# Patient Record
Sex: FEMALE | Race: AMERICAN INDIAN OR ALASKA NATIVE
[De-identification: names, ages, dates, MRNs, and addresses within clinical notes are randomized per-mention and may not be internally consistent; named-entity substitution may affect disease eponyms.]

---

## 2018-09-14 ENCOUNTER — HOSPITAL ENCOUNTER (EMERGENCY)
Dept: HOSPITAL 56 - MW.ED | Age: 48
Discharge: HOME | End: 2018-09-14
Payer: MEDICARE

## 2018-09-14 DIAGNOSIS — K94.09: Primary | ICD-10-CM

## 2018-09-14 DIAGNOSIS — F17.210: ICD-10-CM

## 2018-09-14 LAB
CHLORIDE SERPL-SCNC: 95 MMOL/L (ref 98–107)
SODIUM SERPL-SCNC: 130 MMOL/L (ref 136–145)

## 2018-09-14 PROCEDURE — 85025 COMPLETE CBC W/AUTO DIFF WBC: CPT

## 2018-09-14 PROCEDURE — 99284 EMERGENCY DEPT VISIT MOD MDM: CPT

## 2018-09-14 PROCEDURE — 80053 COMPREHEN METABOLIC PANEL: CPT

## 2018-09-14 PROCEDURE — 96361 HYDRATE IV INFUSION ADD-ON: CPT

## 2018-09-14 PROCEDURE — 83605 ASSAY OF LACTIC ACID: CPT

## 2018-09-14 PROCEDURE — 96365 THER/PROPH/DIAG IV INF INIT: CPT

## 2018-09-14 NOTE — EDM.PDOC
ED HPI GENERAL MEDICAL PROBLEM





- General


Chief Complaint: Abdominal Pain


Stated Complaint: ABDOMINAL PAIN, HAS OSTOMY BAG


Time Seen by Provider: 09/14/18 07:31


Source of Information: Reports: Patient


History Limitations: Reports: No Limitations





- History of Present Illness


INITIAL COMMENTS - FREE TEXT/NARRATIVE: 





History of present illness:


[]Patient had an ovarian mass removed in May this year in [facility] and 

patient ended up with a colostomy. It was functioning until a month ago when 

stool started leaking from her midline incision and not from her ostomy. She 

was seen back in [facility] to make follow-up appointment however she did 

not follow-up. She has been losing weight and is unable to keep the drainage 

dry being that the ostomy bags would not cover her scar tissue. She denies any 

fevers, chills or vomiting.





Review of systems: 


As per history of present illness and below otherwise all systems reviewed and 

negative.





Past medical history: 


As per history of present illness and as reviewed below otherwise 

noncontributory.





Surgical history: 


As per history of present illness and as reviewed below otherwise 

noncontributory.





Social history: 


No reported history of drug or alcohol abuse.





Family history: 


As per history of present illness and as reviewed below otherwise 

noncontributory.





Physical exam:


General: Well developed, cachectic in NAD


HEENT: Atraumatic, normocephalic, pupils reactive, negative for conjunctival 

pallor or scleral icterus, mucous membranes dry, throat clear, neck supple, 

nontender, trachea midline.


Lungs: Clear to auscultation, breath sounds equal bilaterally, chest nontender.


Heart: S1S2, regular, negative for clicks, rubs, or JVD.


Abdomen: Soft, nondistended, left lower quadrant ostomy  with no air in the 

bag. Lower Midline incision scarred with opening draining stool. Negative for 

masses or hepatosplenomegaly. Negative for costovertebral tenderness.


Pelvis: Stable nontender.


Genitourinary: Deferred.


Rectal: Deferred.


Extremities: Atraumatic, negative for cords or calf pain. Neurovascular 

unremarkable.


Neuro: Awake, alert, oriented. Cranial nerves II through XII unremarkable. 

Cerebellum unremarkable. Motor and sensory unremarkable throughout. Exam 

nonfocal.


Skin:warm and dry





Diagnostics:


CBC, chemistry, lactate





Therapeutics:


IV fluids, Zosyn started





ED Course:


Patient remained stable while in the ED





Impression: 


Failed ostomy,  fistula between the incision and bowel





Prescriptions:








Plan:


Discussed with Dr. Diaz general surgery on-call and Dr. Gayle neurosurgery 

at [facility] who accepted her for transfer





Definitive disposition and diagnosis as appropriate pending reevaluation and 

review of above.





- Related Data


 Allergies











Allergy/AdvReac Type Severity Reaction Status Date / Time


 


No Known Allergies Allergy   Verified 09/14/18 07:41











Home Meds: 


 Home Meds





oxyCODONE ER [OxyCONTIN] 1 tab PO Q6H PRN 09/14/18 [History]











Past Medical History





- Past Surgical History


GI Surgical History: Reports: Other (See Below)


Other GI Surgeries/Procedures: perf colon and ostomy


Female  Surgical History: Reports: Hysterectomy, Other (See Below)


Other Female  Surgeries/Procedures: "mass in tube"





Social & Family History





- Family History


Family Medical History: Noncontributory





- Tobacco Use


Smoking Status *Q: Current Every Day Smoker


Years of Tobacco use: 10


Packs/Tins Daily: 0.5





- Recreational Drug Use


Recreational Drug Use: No





ED ROS GENERAL





- Review of Systems


Review Of Systems: ROS reveals no pertinent complaints other than HPI.





ED EXAM, GI/ABD





- Physical Exam


Exam: See Below (See history of present illness)





Course





- Vital Signs


Last Recorded V/S: 


 Last Vital Signs











Temp  97.1 F   09/14/18 07:44


 


Pulse  127 H  09/14/18 07:44


 


Resp  19   09/14/18 07:44


 


BP  105/82   09/14/18 07:44


 


Pulse Ox  96   09/14/18 07:44














- Orders/Labs/Meds


Orders: 


 Active Orders 24 hr











 Category Date Time Status


 


 Piperacillin/Tazobactam [Piperacil-Tazobact] 3.375 gm Med  09/14/18 09:24 

Active





 Sodium Chloride 0.9% [Normal Saline] 50 ml   





 IV ONETIME   


 


 Sodium Chloride 0.9% [Saline Flush] Med  09/14/18 07:56 Active





 10 ml FLUSH ASDIRECTED PRN   


 


 Sodium Chloride 0.9% [Saline Flush] Med  09/14/18 07:56 Active





 2.5 ml FLUSH ASDIRECTED PRN   


 


 Saline Lock Insert [OM.PC] Stat Oth  09/14/18 07:56 Ordered








 Medication Orders





Piperacillin Sod/Tazobactam (Sod 3.375 gm/ Sodium Chloride)  50 mls @ 100 mls/

hr IV ONETIME ONE


   Stop: 09/14/18 09:53


   Last Admin: 09/14/18 09:39  Dose: 100 mls/hr


Sodium Chloride (Saline Flush)  10 ml FLUSH ASDIRECTED PRN


   PRN Reason: Keep Vein Open


Sodium Chloride (Saline Flush)  2.5 ml FLUSH ASDIRECTED PRN


   PRN Reason: Keep Vein Open








Labs: 


 Laboratory Tests











  09/14/18 09/14/18 09/14/18 Range/Units





  08:05 08:05 08:54 


 


WBC  16.20 H    (4.0-11.0)  K/uL


 


RBC  4.79    (4.30-5.90)  M/uL


 


Hgb  12.0    (12.0-16.0)  g/dL


 


Hct  37.2    (36.0-46.0)  %


 


MCV  77.7 L    (80.0-98.0)  fL


 


MCH  25.1 L    (27.0-32.0)  pg


 


MCHC  32.3    (31.0-37.0)  g/dL


 


RDW Std Deviation  48.7    (28.0-62.0)  fl


 


RDW Coeff of Goldy  17 H    (11.0-15.0)  %


 


Plt Count  517 H    (150-400)  K/uL


 


MPV  8.10    (7.40-12.00)  fL


 


Neut % (Auto)  68.1    (48.0-80.0)  %


 


Lymph % (Auto)  25.6    (16.0-40.0)  %


 


Mono % (Auto)  6.1    (0.0-15.0)  %


 


Eos % (Auto)  0.0    (0.0-7.0)  %


 


Baso % (Auto)  0.2    (0.0-1.5)  %


 


Neut # (Auto)  11.0 H    (1.4-5.7)  K/uL


 


Lymph # (Auto)  4.1 H    (0.6-2.4)  K/uL


 


Mono # (Auto)  1.0 H    (0.0-0.8)  K/uL


 


Eos # (Auto)  0.0    (0.0-0.7)  K/uL


 


Baso # (Auto)  0.0    (0.0-0.1)  K/uL


 


Nucleated RBC %  0.0    /100WBC


 


Nucleated RBCs #  0    K/uL


 


Lactate    0.9  (0.20-2.00)  mmol/L


 


Sodium   130 L   (136-145)  mmol/L


 


Potassium   3.9   (3.5-5.1)  mmol/L


 


Chloride   95 L   ()  mmol/L


 


Carbon Dioxide   25.0   (21.0-32.0)  mmol/L


 


BUN   8   (7.0-18.0)  mg/dL


 


Creatinine   0.7   (0.6-1.0)  mg/dL


 


Est Cr Clr Drug Dosing   66.91   mL/min


 


Estimated GFR (MDRD)   > 60.0   ml/min


 


Glucose   125 H   ()  mg/dL


 


Calcium   8.6   (8.5-10.1)  mg/dL


 


Total Bilirubin   0.3   (0.2-1.0)  mg/dL


 


AST   10 L   (15-37)  IU/L


 


ALT   10 L   (14-63)  IU/L


 


Alkaline Phosphatase   189 H   ()  U/L


 


Total Protein   6.6   (6.4-8.2)  g/dL


 


Albumin   2.0 L   (3.4-5.0)  g/dL


 


Globulin   4.6 H   (2.0-3.5)  g/dL


 


Albumin/Globulin Ratio   0.4 L   (1.3-2.8)  











Meds: 


Medications











Generic Name Dose Route Start Last Admin





  Trade Name Freq  PRN Reason Stop Dose Admin


 


Piperacillin Sod/Tazobactam  50 mls @ 100 mls/hr  09/14/18 09:24  09/14/18 09:39





  Sod 3.375 gm/ Sodium Chloride  IV  09/14/18 09:53  100 mls/hr





  ONETIME ONE   Administration





     





     





     





     


 


Sodium Chloride  10 ml  09/14/18 07:56  





  Saline Flush  FLUSH   





  ASDIRECTED PRN   





  Keep Vein Open   





     





     





     


 


Sodium Chloride  2.5 ml  09/14/18 07:56  





  Saline Flush  FLUSH   





  ASDIRECTED PRN   





  Keep Vein Open   





     





     





     














Discontinued Medications














Generic Name Dose Route Start Last Admin





  Trade Name Freq  PRN Reason Stop Dose Admin


 


Sodium Chloride  1,000 mls @ 999 mls/hr  09/14/18 07:56  09/14/18 08:07





  Normal Saline  IV  09/14/18 08:56  999 mls/hr





  .Bolus ONE   Administration





     





     





     





     














Departure





- Departure


Time of Disposition: 09:50


Disposition: Home, Self-Care 01


Condition: Good


Clinical Impression: 


 Complication of ostomy








- Discharge Information


*PRESCRIPTION DRUG MONITORING PROGRAM REVIEWED*: No


*COPY OF PRESCRIPTION DRUG MONITORING REPORT IN PATIENT BERE: No


Referrals: 


Skyla Vieira NP [Primary Care Provider] - 


Forms:  ED Department Discharge





- My Orders


Last 24 Hours: 


My Active Orders





09/14/18 07:56


Sodium Chloride 0.9% [Saline Flush]   10 ml FLUSH ASDIRECTED PRN 


Sodium Chloride 0.9% [Saline Flush]   2.5 ml FLUSH ASDIRECTED PRN 


Saline Lock Insert [OM.PC] Stat 





09/14/18 09:24


Piperacillin/Tazobactam [Piperacil-Tazobact] 3.375 gm   Sodium Chloride 0.9% [

Normal Saline] 50 ml IV ONETIME 














- Assessment/Plan


Last 24 Hours: 


My Active Orders





09/14/18 07:56


Sodium Chloride 0.9% [Saline Flush]   10 ml FLUSH ASDIRECTED PRN 


Sodium Chloride 0.9% [Saline Flush]   2.5 ml FLUSH ASDIRECTED PRN 


Saline Lock Insert [OM.PC] Stat 





09/14/18 09:24


Piperacillin/Tazobactam [Piperacil-Tazobact] 3.375 gm   Sodium Chloride 0.9% [

Normal Saline] 50 ml IV ONETIME